# Patient Record
Sex: FEMALE | Race: OTHER | HISPANIC OR LATINO
[De-identification: names, ages, dates, MRNs, and addresses within clinical notes are randomized per-mention and may not be internally consistent; named-entity substitution may affect disease eponyms.]

---

## 2017-10-18 ENCOUNTER — APPOINTMENT (OUTPATIENT)
Dept: OBGYN | Facility: CLINIC | Age: 52
End: 2017-10-18
Payer: COMMERCIAL

## 2017-10-18 VITALS — SYSTOLIC BLOOD PRESSURE: 116 MMHG | DIASTOLIC BLOOD PRESSURE: 78 MMHG | HEIGHT: 59 IN

## 2017-10-18 PROCEDURE — 99396 PREV VISIT EST AGE 40-64: CPT

## 2017-10-18 PROCEDURE — 82270 OCCULT BLOOD FECES: CPT

## 2017-10-19 LAB — HPV HIGH+LOW RISK DNA PNL CVX: NOT DETECTED

## 2017-10-23 LAB — CYTOLOGY CVX/VAG DOC THIN PREP: NORMAL

## 2017-11-01 ENCOUNTER — APPOINTMENT (OUTPATIENT)
Dept: OBGYN | Facility: CLINIC | Age: 52
End: 2017-11-01
Payer: COMMERCIAL

## 2017-11-01 DIAGNOSIS — N84.1 POLYP OF CERVIX UTERI: ICD-10-CM

## 2017-11-01 PROCEDURE — 57500 BIOPSY OF CERVIX: CPT

## 2017-11-03 LAB — CORE LAB BIOPSY: NORMAL

## 2017-11-15 ENCOUNTER — APPOINTMENT (OUTPATIENT)
Dept: OBGYN | Facility: CLINIC | Age: 52
End: 2017-11-15
Payer: COMMERCIAL

## 2017-11-15 DIAGNOSIS — N84.1 POLYP OF CERVIX UTERI: ICD-10-CM

## 2017-11-15 DIAGNOSIS — Z09 ENCOUNTER FOR FOLLOW-UP EXAMINATION AFTER COMPLETED TREATMENT FOR CONDITIONS OTHER THAN MALIGNANT NEOPLASM: ICD-10-CM

## 2017-11-15 PROCEDURE — 99212 OFFICE O/P EST SF 10 MIN: CPT

## 2018-01-29 ENCOUNTER — OUTPATIENT (OUTPATIENT)
Dept: OUTPATIENT SERVICES | Facility: HOSPITAL | Age: 53
LOS: 1 days | End: 2018-01-29
Payer: COMMERCIAL

## 2018-01-29 ENCOUNTER — TRANSCRIPTION ENCOUNTER (OUTPATIENT)
Age: 53
End: 2018-01-29

## 2018-01-29 DIAGNOSIS — Z12.11 ENCOUNTER FOR SCREENING FOR MALIGNANT NEOPLASM OF COLON: ICD-10-CM

## 2018-01-29 PROCEDURE — G0121: CPT

## 2018-12-13 ENCOUNTER — APPOINTMENT (OUTPATIENT)
Dept: OBGYN | Facility: CLINIC | Age: 53
End: 2018-12-13
Payer: COMMERCIAL

## 2018-12-13 VITALS
WEIGHT: 166 LBS | BODY MASS INDEX: 33.47 KG/M2 | HEIGHT: 59 IN | SYSTOLIC BLOOD PRESSURE: 118 MMHG | DIASTOLIC BLOOD PRESSURE: 74 MMHG

## 2018-12-13 DIAGNOSIS — Z01.419 ENCOUNTER FOR GYNECOLOGICAL EXAMINATION (GENERAL) (ROUTINE) W/OUT ABNORMAL FINDINGS: ICD-10-CM

## 2018-12-13 PROCEDURE — 99396 PREV VISIT EST AGE 40-64: CPT

## 2018-12-13 PROCEDURE — 82270 OCCULT BLOOD FECES: CPT

## 2018-12-17 LAB — HPV HIGH+LOW RISK DNA PNL CVX: NOT DETECTED

## 2018-12-18 LAB — CYTOLOGY CVX/VAG DOC THIN PREP: NORMAL

## 2019-05-21 ENCOUNTER — EMERGENCY (EMERGENCY)
Facility: HOSPITAL | Age: 54
LOS: 1 days | Discharge: HOME | End: 2019-05-21
Attending: EMERGENCY MEDICINE
Payer: COMMERCIAL

## 2019-05-21 VITALS
SYSTOLIC BLOOD PRESSURE: 150 MMHG | HEART RATE: 75 BPM | TEMPERATURE: 99 F | HEIGHT: 65 IN | WEIGHT: 156.97 LBS | OXYGEN SATURATION: 99 % | RESPIRATION RATE: 20 BRPM | DIASTOLIC BLOOD PRESSURE: 90 MMHG

## 2019-05-21 PROCEDURE — 99283 EMERGENCY DEPT VISIT LOW MDM: CPT

## 2019-05-21 RX ORDER — IBUPROFEN 200 MG
600 TABLET ORAL ONCE
Refills: 0 | Status: COMPLETED | OUTPATIENT
Start: 2019-05-21 | End: 2019-05-21

## 2019-05-21 RX ORDER — ONDANSETRON 8 MG/1
4 TABLET, FILM COATED ORAL ONCE
Refills: 0 | Status: COMPLETED | OUTPATIENT
Start: 2019-05-21 | End: 2019-05-21

## 2019-05-21 RX ORDER — LIDOCAINE 4 G/100G
1 CREAM TOPICAL ONCE
Refills: 0 | Status: COMPLETED | OUTPATIENT
Start: 2019-05-21 | End: 2019-05-21

## 2019-05-21 RX ORDER — METHOCARBAMOL 500 MG/1
1500 TABLET, FILM COATED ORAL ONCE
Refills: 0 | Status: COMPLETED | OUTPATIENT
Start: 2019-05-21 | End: 2019-05-21

## 2019-05-21 RX ORDER — IBUPROFEN 200 MG
1 TABLET ORAL
Qty: 28 | Refills: 0
Start: 2019-05-21 | End: 2019-05-27

## 2019-05-21 RX ORDER — METHOCARBAMOL 500 MG/1
1 TABLET, FILM COATED ORAL
Qty: 10 | Refills: 0
Start: 2019-05-21 | End: 2019-05-25

## 2019-05-21 RX ADMIN — METHOCARBAMOL 1500 MILLIGRAM(S): 500 TABLET, FILM COATED ORAL at 19:50

## 2019-05-21 RX ADMIN — LIDOCAINE 1 PATCH: 4 CREAM TOPICAL at 19:50

## 2019-05-21 RX ADMIN — Medication 600 MILLIGRAM(S): at 19:50

## 2019-05-21 RX ADMIN — ONDANSETRON 4 MILLIGRAM(S): 8 TABLET, FILM COATED ORAL at 19:50

## 2019-05-21 NOTE — ED PROVIDER NOTE - ATTENDING CONTRIBUTION TO CARE
I personally saw the patient with the PA, and completed the key components of the history and physical exam. I then discussed the management plan with the PA.   gen in nad rfesp clear cardiac no murmur abd soft neuro: CN II - XII intact, EOMI, PERRL, no papilledema, 5/5 muscle strength x 4 extremities, no sensory deficits, 2+ dtr globally, negative babinski, no ataxic gait, normal TIFFANIE and FNT, normal romberg   return to ed for intractable HA, persistent vomiting, or new onset motor/sensory deficits
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
## 2019-05-21 NOTE — ED PROVIDER NOTE - PROGRESS NOTE DETAILS
RYLEE Adan: Patient/guardian educated on return precautions. Patient/guardian provided ample opportunity to ask questions which were answered to the fullest extent. Patient/guardian verbalized understanding and agreement of plan.
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
## 2024-09-13 ENCOUNTER — APPOINTMENT (OUTPATIENT)
Dept: GASTROENTEROLOGY | Facility: CLINIC | Age: 59
End: 2024-09-13
Payer: COMMERCIAL

## 2024-09-13 VITALS
BODY MASS INDEX: 31.65 KG/M2 | TEMPERATURE: 97.5 F | WEIGHT: 157 LBS | HEIGHT: 59 IN | HEART RATE: 67 BPM | SYSTOLIC BLOOD PRESSURE: 110 MMHG | DIASTOLIC BLOOD PRESSURE: 70 MMHG | OXYGEN SATURATION: 98 %

## 2024-09-13 DIAGNOSIS — Z12.11 ENCOUNTER FOR SCREENING FOR MALIGNANT NEOPLASM OF COLON: ICD-10-CM

## 2024-09-13 PROCEDURE — 99203 OFFICE O/P NEW LOW 30 MIN: CPT

## 2024-09-13 RX ORDER — SODIUM SULFATE, POTASSIUM SULFATE AND MAGNESIUM SULFATE 1.6; 3.13; 17.5 G/177ML; G/177ML; G/177ML
17.5-3.13-1.6 SOLUTION ORAL
Qty: 2 | Refills: 0 | Status: ACTIVE | COMMUNITY
Start: 2024-09-13 | End: 1900-01-01

## 2024-09-13 NOTE — HISTORY OF PRESENT ILLNESS
[FreeTextEntry1] : Patient is a 58-year-old female who presents to the gastroenterology office today for evaluation prior to scheduling screening colonoscopy.  Patient reports having a prior colonoscopy 10 years ago at an outside facility and reports her prior colonoscopy was normal.  She reports no polyps were removed on her prior colonoscopy.  Patient denies experiencing abdominal pain, chest pain, shortness of breath, nausea or vomiting.  Patient denies diarrhea.  She reports having 1-2 bowel movements per day, consisting of formed stools.  Patient denies blood in stools.  Patient denies family history of colon cancer or rectal cancer to her knowledge.

## 2024-09-13 NOTE — PHYSICAL EXAM
[Alert] : alert [No Acute Distress] : no acute distress [Sclera] : the sclera and conjunctiva were normal [Oropharynx] : the oropharynx was normal [Normal Appearance] : the appearance of the neck was normal [No Respiratory Distress] : no respiratory distress [Auscultation Breath Sounds / Voice Sounds] : lungs were clear to auscultation bilaterally [Heart Rate And Rhythm] : heart rate was normal and rhythm regular [Normal S1, S2] : normal S1 and S2 [Murmurs] : no murmurs [None] : no edema [Bowel Sounds] : normal bowel sounds [Abdomen Tenderness] : non-tender [Abdomen Soft] : soft [Rebound Tenderness] : no rebound tenderness [No CVA Tenderness] : no CVA  tenderness [Abnormal Walk] : normal gait [Normal Color / Pigmentation] : normal skin color and pigmentation [Oriented To Time, Place, And Person] : oriented to person, place, and time [Normal Affect] : the affect was normal [Normal Mood] : the mood was normal

## 2024-09-13 NOTE — ASSESSMENT
[FreeTextEntry1] : Patient is a 58-year-old female seen in the gastroenterology office today prior to scheduling screening colonoscopy.  Patient reported having a prior colonoscopy 10 years ago at an outside facility and reported her prior colonoscopy was normal.  Will schedule patient for colonoscopy for screening for colon cancer.  Discussed indication, benefits/risks and alternatives to colonoscopy with the patient.  She reported understanding and is in agreement with proceeding with colonoscopy.  All of her questions were answered.   --Schedule colonoscopy.

## 2024-12-19 ENCOUNTER — APPOINTMENT (OUTPATIENT)
Dept: GASTROENTEROLOGY | Facility: AMBULATORY MEDICAL SERVICES | Age: 59
End: 2024-12-19
Payer: COMMERCIAL

## 2024-12-19 PROCEDURE — 45385 COLONOSCOPY W/LESION REMOVAL: CPT | Mod: 33

## 2024-12-24 ENCOUNTER — RX ONLY (RX ONLY)
Age: 59
End: 2024-12-24

## 2024-12-24 ENCOUNTER — DOCTOR'S OFFICE (OUTPATIENT)
Facility: LOCATION | Age: 59
Setting detail: OPHTHALMOLOGY
End: 2024-12-24
Payer: COMMERCIAL

## 2024-12-24 DIAGNOSIS — H35.373: ICD-10-CM

## 2024-12-24 DIAGNOSIS — H00.022: ICD-10-CM

## 2024-12-24 DIAGNOSIS — H25.13: ICD-10-CM

## 2024-12-24 DIAGNOSIS — H01.004: ICD-10-CM

## 2024-12-24 DIAGNOSIS — H16.222: ICD-10-CM

## 2024-12-24 PROCEDURE — 92004 COMPRE OPH EXAM NEW PT 1/>: CPT | Performed by: OPHTHALMOLOGY

## 2024-12-24 ASSESSMENT — REFRACTION_AUTOREFRACTION
OD_AXIS: 169
OS_SPHERE: +1.00
OD_CYLINDER: +0.25
OS_AXIS: 017
OD_SPHERE: +1.25
OS_CYLINDER: +0.25

## 2024-12-24 ASSESSMENT — REFRACTION_MANIFEST
OD_CYLINDER: -0.25
OS_SPHERE: +1.25
OS_CYLINDER: -0.25
OS_VA1: 20/25-2
OD_SPHERE: +1.50
OD_AXIS: 079
OD_VA1: 20/20
OS_AXIS: 107

## 2024-12-24 ASSESSMENT — REFRACTION_CURRENTRX
OD_VPRISM_DIRECTION: BF
OS_ADD: +2.25
OS_ADD: +2.00
OD_ADD: +2.00
OD_AXIS: 000
OS_OVR_VA: 20/
OS_AXIS: 000
OS_SPHERE: +0.75
OD_OVR_VA: 20/
OD_OVR_VA: 20/
OD_SPHERE: +0.25
OD_SPHERE: +0.75
OD_CYLINDER: SPHERE
OS_SPHERE: +0.25
OS_CYLINDER: SPHERE
OS_VPRISM_DIRECTION: BF
OD_ADD: +2.25
OS_OVR_VA: 20/

## 2024-12-24 ASSESSMENT — SUPERFICIAL PUNCTATE KERATITIS (SPK): OS_SPK: 1+

## 2024-12-24 ASSESSMENT — KERATOMETRY
OS_K1POWER_DIOPTERS: 45.25
OD_K1POWER_DIOPTERS: 45.00
OS_K2POWER_DIOPTERS: 46.50
OD_AXISANGLE_DEGREES: 088
OS_AXISANGLE_DEGREES: 085
OD_K2POWER_DIOPTERS: 46.00

## 2024-12-24 ASSESSMENT — LID EXAM ASSESSMENTS
OD_COMMENTS: SHORTENED AND MISSING GLANDS
OD_MEIBOMITIS: RLL 1+
OS_BLEPHARITIS: LUL T

## 2024-12-24 ASSESSMENT — TONOMETRY
OD_IOP_MMHG: 18
OS_IOP_MMHG: 16

## 2024-12-24 ASSESSMENT — VISUAL ACUITY
OS_BCVA: 20/25+2
OD_BCVA: 20/40+1

## 2024-12-24 ASSESSMENT — CONFRONTATIONAL VISUAL FIELD TEST (CVF)
OS_FINDINGS: FULL
OD_FINDINGS: FULL

## 2024-12-30 ENCOUNTER — DOCTOR'S OFFICE (OUTPATIENT)
Facility: LOCATION | Age: 59
Setting detail: OPHTHALMOLOGY
End: 2024-12-30
Payer: COMMERCIAL

## 2024-12-30 DIAGNOSIS — H43.392: ICD-10-CM

## 2024-12-30 DIAGNOSIS — H16.222: ICD-10-CM

## 2024-12-30 DIAGNOSIS — H35.373: ICD-10-CM

## 2024-12-30 DIAGNOSIS — H43.821: ICD-10-CM

## 2024-12-30 PROBLEM — H00.022 HORDEOLUM INTERNUM; RIGHT LOWER LID: Status: ACTIVE | Noted: 2024-12-24

## 2024-12-30 PROBLEM — H25.13 CATARACT SENILE NUCLEAR SCLEROSIS; BOTH EYES: Status: ACTIVE | Noted: 2024-12-24

## 2024-12-30 PROBLEM — H01.004 BLEPHARITIS; LEFT UPPER LID: Status: ACTIVE | Noted: 2024-12-24

## 2024-12-30 PROCEDURE — 92201 OPSCPY EXTND RTA DRAW UNI/BI: CPT | Performed by: OPHTHALMOLOGY

## 2024-12-30 PROCEDURE — 92134 CPTRZ OPH DX IMG PST SGM RTA: CPT | Performed by: OPHTHALMOLOGY

## 2024-12-30 PROCEDURE — 92012 INTRM OPH EXAM EST PATIENT: CPT | Performed by: OPHTHALMOLOGY

## 2024-12-30 ASSESSMENT — REFRACTION_AUTOREFRACTION
OS_SPHERE: +1.00
OD_SPHERE: +1.25
OS_AXIS: 017
OD_CYLINDER: +0.25
OS_CYLINDER: +0.25
OD_AXIS: 169

## 2024-12-30 ASSESSMENT — LID EXAM ASSESSMENTS
OS_BLEPHARITIS: LUL T
OD_MEIBOMITIS: RLL 1+
OD_COMMENTS: SHORTENED AND MISSING GLANDS

## 2024-12-30 ASSESSMENT — KERATOMETRY
OD_AXISANGLE_DEGREES: 088
OS_K2POWER_DIOPTERS: 46.50
OS_AXISANGLE_DEGREES: 085
OD_K1POWER_DIOPTERS: 45.00
OS_K1POWER_DIOPTERS: 45.25
OD_K2POWER_DIOPTERS: 46.00

## 2024-12-30 ASSESSMENT — SUPERFICIAL PUNCTATE KERATITIS (SPK): OS_SPK: 1+

## 2024-12-30 ASSESSMENT — VISUAL ACUITY
OS_BCVA: 20/25
OD_BCVA: 20/50-

## 2025-06-18 ENCOUNTER — APPOINTMENT (OUTPATIENT)
Dept: GASTROENTEROLOGY | Facility: CLINIC | Age: 60
End: 2025-06-18
Payer: COMMERCIAL

## 2025-06-18 VITALS
HEIGHT: 59 IN | WEIGHT: 152 LBS | SYSTOLIC BLOOD PRESSURE: 110 MMHG | HEART RATE: 64 BPM | DIASTOLIC BLOOD PRESSURE: 80 MMHG | BODY MASS INDEX: 30.64 KG/M2 | OXYGEN SATURATION: 98 % | TEMPERATURE: 97.7 F

## 2025-06-18 PROCEDURE — 99213 OFFICE O/P EST LOW 20 MIN: CPT

## 2025-07-24 ENCOUNTER — DOCTOR'S OFFICE (OUTPATIENT)
Facility: LOCATION | Age: 60
Setting detail: OPHTHALMOLOGY
End: 2025-07-24
Payer: COMMERCIAL

## 2025-07-24 DIAGNOSIS — H35.373: ICD-10-CM

## 2025-07-24 DIAGNOSIS — H00.14: ICD-10-CM

## 2025-07-24 DIAGNOSIS — H43.821: ICD-10-CM

## 2025-07-24 PROCEDURE — 92134 CPTRZ OPH DX IMG PST SGM RTA: CPT | Performed by: OPHTHALMOLOGY

## 2025-07-24 PROCEDURE — 92014 COMPRE OPH EXAM EST PT 1/>: CPT | Performed by: OPHTHALMOLOGY

## 2025-07-24 ASSESSMENT — LID EXAM ASSESSMENTS
OD_MEIBOMITIS: RLL 1+
OS_BLEPHARITIS: LUL T
OD_COMMENTS: SHORTENED AND MISSING GLANDS

## 2025-07-24 ASSESSMENT — KERATOMETRY
OD_K2POWER_DIOPTERS: 46.00
OD_AXISANGLE_DEGREES: 088
OS_K2POWER_DIOPTERS: 46.50
OD_K1POWER_DIOPTERS: 45.00
OS_AXISANGLE_DEGREES: 085
OS_K1POWER_DIOPTERS: 45.25

## 2025-07-24 ASSESSMENT — VISUAL ACUITY
OD_BCVA: 20/80-2
OS_BCVA: 20/70+2

## 2025-07-24 ASSESSMENT — REFRACTION_AUTOREFRACTION
OS_AXIS: 017
OD_SPHERE: +1.25
OD_AXIS: 169
OS_CYLINDER: +0.25
OD_CYLINDER: +0.25
OS_SPHERE: +1.00

## 2025-07-24 ASSESSMENT — SUPERFICIAL PUNCTATE KERATITIS (SPK): OS_SPK: 1+

## 2025-08-01 ENCOUNTER — DOCTOR'S OFFICE (OUTPATIENT)
Facility: LOCATION | Age: 60
Setting detail: OPHTHALMOLOGY
End: 2025-08-01
Payer: COMMERCIAL

## 2025-08-01 DIAGNOSIS — H52.4: ICD-10-CM

## 2025-08-01 DIAGNOSIS — H35.373: ICD-10-CM

## 2025-08-01 DIAGNOSIS — H43.821: ICD-10-CM

## 2025-08-01 PROBLEM — H00.14 CHALAZION; LEFT UPPER LID: Status: ACTIVE | Noted: 2025-07-24

## 2025-08-01 PROBLEM — H52.7 REFRACTIVE ERROR: Status: ACTIVE | Noted: 2025-08-01

## 2025-08-01 PROCEDURE — 99213 OFFICE O/P EST LOW 20 MIN: CPT | Performed by: OPHTHALMOLOGY

## 2025-08-01 PROCEDURE — 92015 DETERMINE REFRACTIVE STATE: CPT | Performed by: OPHTHALMOLOGY

## 2025-08-01 ASSESSMENT — REFRACTION_AUTOREFRACTION
OS_AXIS: 024
OS_CYLINDER: +0.25
OS_SPHERE: +1.50
OD_CYLINDER: +0.25
OD_AXIS: 166
OD_SPHERE: +1.50

## 2025-08-01 ASSESSMENT — VISUAL ACUITY
OS_BCVA: 20/80
OD_BCVA: 20/70

## 2025-08-01 ASSESSMENT — REFRACTION_MANIFEST
OS_AXIS: 035
OD_SPHERE: +1.50
OD_CYLINDER: +0.25
OS_VA1: 20/50-2
OD_VA1: 20/20-1
OD_ADD: +2.75
OS_SPHERE: +1.50
OD_AXIS: 160
OS_CYLINDER: +0.25
OS_ADD: +2.75

## 2025-08-01 ASSESSMENT — CONFRONTATIONAL VISUAL FIELD TEST (CVF)
OS_FINDINGS: FULL
OD_FINDINGS: FULL

## 2025-08-01 ASSESSMENT — KERATOMETRY
OD_AXISANGLE_DEGREES: 082
OD_K2POWER_DIOPTERS: 46.50
OS_K2POWER_DIOPTERS: 46.50
OD_K1POWER_DIOPTERS: 45.50
OS_K1POWER_DIOPTERS: 45.75
OS_AXISANGLE_DEGREES: 085

## 2025-08-01 ASSESSMENT — LID EXAM ASSESSMENTS
OS_BLEPHARITIS: LUL T
OD_COMMENTS: SHORTENED AND MISSING GLANDS
OD_MEIBOMITIS: RLL 1+

## 2025-08-01 ASSESSMENT — SUPERFICIAL PUNCTATE KERATITIS (SPK): OS_SPK: 1+
